# Patient Record
(demographics unavailable — no encounter records)

---

## 2024-12-03 NOTE — PLAN
[FreeTextEntry1] : Labs Drawn by Dr. Nazario Otero due to poor venous access.  Patient required lab testing due to conditions in their past medical history requiring periodic monitoring.  Labs were sent to [facility].  Discussed and reviewed current medications as follows;  Amlodipine/HCTZ/Losartan- hypertension, Glimepiride- diabetes, Colesevelam- kidney disease Patient to continue with present medications - all medications reconciled/reviewed during this visit and listed above.   Increase fluid intake. RTO in 7-10 days for re-evaluation. At least 30 minutes was spent with patient face to face examining and reviewing findings/results during this visit. Ample time was provided to answer any questions or address concerns to the patients satisfaction.   I, Verna Dawson, attest that this documentation has been prepared under the direction and in the presence of Provider Nazario Otero DNP  The documentation recorded by the scribe, in my presence, accurately reflects the service I personally performed, and the decisions made by me with my edits as appropriate. Nazario Otero DNP

## 2024-12-03 NOTE — HISTORY OF PRESENT ILLNESS
[FreeTextEntry1] : Cholesterol and A1C [de-identified] : Patient encounter today for re-evaluation of cholesterol and A1C.  States she has been doing well.  Denies any CP, SOB or diff breathing.  No recent fever, chills, cough or cold type symptoms.  Has no other complaints at this time.

## 2024-12-03 NOTE — REVIEW OF SYSTEMS
Esther Mayfield,RN BSN  [facility]  [phone]              
[Negative] : Heme/Lymph

## 2024-12-18 NOTE — PLAN
[FreeTextEntry1] : Labs reviewed with patient during this encounter.  A1C = 6.3 from 6.1  Patient to continue with present medications - all medications reconciled/reviewed during this visit and listed above Patient to start Vitamin therapy as discussed during visit Increase fluid intake..  RTO for repeat labs in 6 months.  RTO in 6 months for re-evaluation.  Diet teaching for at least 8 minutes.performed in depth during this visit related to cholesterol and cardiovascular risks. At least 25 minutes was spent with patient via video conference reviewing findings/results during this visit. Ample time was provided to answer any questions or address concerns to the patients satisfaction..  Patient was advised that this audiovisual encounter constitutes a billable visit, and that the visit will be billed on the same terms and conditions as an in-office, face-to-face visit. Patient was further advised they may be responsible for any co-payment, co-insurance, or other self-payment they would normally pay for an office visit, unless such self-payment was waived by their insurance carrier.

## 2024-12-18 NOTE — HISTORY OF PRESENT ILLNESS
[Home] : at home, [unfilled] , at the time of the visit. [Medical Office: ([facility])___] : at the medical office located in  [Verbal consent obtained from patient] : the patient, [unfilled] [FreeTextEntry1] : Cholesterol and diabetes [de-identified] : Patient encounter today for re-evaluation of diabetes and cholesterol.  States she has been doing well.  Denies any CP, SOB or diff breathing.  No recent fever, chills, cough or cold type symptoms.  Has no other complaints at this time.

## 2025-06-03 NOTE — HISTORY OF PRESENT ILLNESS
[FreeTextEntry1] : annual wellness visit [de-identified] : Patient presents today for physical exam. Her last PE was over a year ago and since that time she has not had any significant changes to her medical history. Patient had her annual follow up with cardiology 2 weeks ago, reports everything is normal. Denies any CP, SOB or diff breathing. Denies abdominal pain, blood in stool, or changes in bowel habits. Denies any fever, chills, cough or cold type symptoms. Has no other complaints at this time.

## 2025-06-03 NOTE — HEALTH RISK ASSESSMENT
[0] : 2) Feeling down, depressed, or hopeless: Not at all (0) [PHQ-2 Negative - No further assessment needed] : PHQ-2 Negative - No further assessment needed [Time Spent: ___ Minutes] : I spent [unfilled] minutes performing a depression screening for this patient. [Patient reported mammogram was normal] : Patient reported mammogram was normal [Patient declined colonoscopy] : Patient declined colonoscopy [MammogramDate] : 08/2024

## 2025-06-03 NOTE — PLAN
[FreeTextEntry1] : Labs Drawn by Dr. Nazario Otero due to poor venous access.  Patient required lab testing due to conditions in their past medical history requiring periodic monitoring.  Labs were sent to [facility].  Discussed and reviewed medications with patient as follows; Losartan/HCTZ/Amlodipine- hypertension, Glimepiride- diabetes Patient to continue with present medications - all medications reconciled/reviewed during this visit and listed above.  Increase fluid intake.  RTO in 7-10 days for re-evaluation.   I, Verna Dawson, attest that this documentation has been prepared under the direction and in the presence of Provider Nazario Otero DNP  The documentation recorded by the scribe, in my presence, accurately reflects the service I personally performed, and the decisions made by me with my edits as appropriate. Nazario Otero DNP

## 2025-06-10 NOTE — HISTORY OF PRESENT ILLNESS
[Spouse] : spouse [Home] : at home, [unfilled] , at the time of the visit. [Medical Office: ([facility])___] : at the medical office located in  [Telehealth (audio & video)] : This visit was provided via telehealth using real-time 2-way audio visual technology. [Verbal consent obtained from patient] : the patient, [unfilled] [FreeTextEntry1] : Diabetes [de-identified] : Patient encounter today for re-evaluation of diabetes.  States she has been doing well.  Denies any CP, SOB or diff breathing.  No recent fever, chills, cough or cold type symptoms.  Has no other complaints at this time.

## 2025-06-10 NOTE — PLAN
[FreeTextEntry1] : Labs reviewed with patient during this encounter.  A1C = 6.7 from 6.3 Chol = 201 from 172  Patient to continue with present medications - all medications reconciled/reviewed during this visit and listed above Patient to start Vitamin therapy as discussed during visit Increase fluid intake..  RTO for repeat labs in 3 months.  RTO in 3 months for re-evaluation.  Diet teaching for at least 8 minutes.performed in depth during this visit related to cholesterol and cardiovascular risks. At least 35 minutes was spent with patient via video conference reviewing findings/results during this visit. Ample time was provided to answer any questions or address concerns to the patients satisfaction..  Patient was advised that this audiovisual encounter constitutes a billable visit, and that the visit will be billed on the same terms and conditions as an in-office, face-to-face visit. Patient was further advised they may be responsible for any co-payment, co-insurance, or other self-payment they would normally pay for an office visit, unless such self-payment was waived by their insurance carrier.